# Patient Record
Sex: FEMALE | URBAN - NONMETROPOLITAN AREA
[De-identification: names, ages, dates, MRNs, and addresses within clinical notes are randomized per-mention and may not be internally consistent; named-entity substitution may affect disease eponyms.]

---

## 2020-08-10 NOTE — IMPRESSION/PLAN
Impression: Foreign body in conjunctival sac of right eye, initial encounter: T15.11XA. Plan: After informed consent was obtained, the patient was seated at the slit lamp and topical anesthesia was instilled. The conjunctival foreign body was removed with sterile Proparacaine soaked cotton tipped applicator. The patient left the office suite in excellent condition, and there were no intraoperative complications. Start Ofloxacin QID for 5 days OD.

## 2022-09-01 ENCOUNTER — OFFICE VISIT (OUTPATIENT)
Dept: URBAN - NONMETROPOLITAN AREA CLINIC 8 | Facility: CLINIC | Age: 52
End: 2022-09-01
Payer: COMMERCIAL

## 2022-09-01 DIAGNOSIS — H52.4 PRESBYOPIA: Primary | ICD-10-CM

## 2022-09-01 PROCEDURE — 92012 INTRM OPH EXAM EST PATIENT: CPT | Performed by: OPTOMETRIST

## 2022-09-01 ASSESSMENT — VISUAL ACUITY
OD: 20/20
OS: 20/20

## 2022-09-01 ASSESSMENT — INTRAOCULAR PRESSURE
OS: 17
OD: 17